# Patient Record
Sex: MALE | Race: WHITE | NOT HISPANIC OR LATINO
[De-identification: names, ages, dates, MRNs, and addresses within clinical notes are randomized per-mention and may not be internally consistent; named-entity substitution may affect disease eponyms.]

---

## 2021-02-17 PROBLEM — Z00.00 ENCOUNTER FOR PREVENTIVE HEALTH EXAMINATION: Status: ACTIVE | Noted: 2021-02-17

## 2021-02-23 ENCOUNTER — APPOINTMENT (OUTPATIENT)
Dept: SURGERY | Facility: CLINIC | Age: 71
End: 2021-02-23
Payer: MEDICARE

## 2021-02-23 VITALS
WEIGHT: 185 LBS | TEMPERATURE: 98 F | DIASTOLIC BLOOD PRESSURE: 94 MMHG | OXYGEN SATURATION: 97 % | SYSTOLIC BLOOD PRESSURE: 169 MMHG | HEIGHT: 72 IN | BODY MASS INDEX: 25.06 KG/M2 | RESPIRATION RATE: 16 BRPM | HEART RATE: 57 BPM

## 2021-02-23 DIAGNOSIS — Z80.3 FAMILY HISTORY OF MALIGNANT NEOPLASM OF BREAST: ICD-10-CM

## 2021-02-23 DIAGNOSIS — F12.90 CANNABIS USE, UNSPECIFIED, UNCOMPLICATED: ICD-10-CM

## 2021-02-23 DIAGNOSIS — K57.92 DIVERTICULITIS OF INTESTINE, PART UNSPECIFIED, W/OUT PERFORATION OR ABSCESS W/OUT BLEEDING: ICD-10-CM

## 2021-02-23 DIAGNOSIS — K40.90 UNILATERAL INGUINAL HERNIA, W/OUT OBSTRUCTION OR GANGRENE, NOT SPECIFIED AS RECURRENT: ICD-10-CM

## 2021-02-23 DIAGNOSIS — Z87.891 PERSONAL HISTORY OF NICOTINE DEPENDENCE: ICD-10-CM

## 2021-02-23 DIAGNOSIS — R10.32 LEFT LOWER QUADRANT PAIN: ICD-10-CM

## 2021-02-23 DIAGNOSIS — Z80.0 FAMILY HISTORY OF MALIGNANT NEOPLASM OF DIGESTIVE ORGANS: ICD-10-CM

## 2021-02-23 PROCEDURE — 99203 OFFICE O/P NEW LOW 30 MIN: CPT

## 2021-02-23 NOTE — CONSULT LETTER
[Dear  ___] : Dear  [unfilled], [Consult Letter:] : I had the pleasure of evaluating your patient, [unfilled]. [Please see my note below.] : Please see my note below. [Consult Closing:] : Thank you very much for allowing me to participate in the care of this patient.  If you have any questions, please do not hesitate to contact me. [Sincerely,] : Sincerely, [FreeTextEntry3] : Nabeel Ocampo M.D., F.A.C.S, F.A.S.C.R.S

## 2021-02-23 NOTE — PHYSICAL EXAM
[Normal Rate and Rhythm] : normal rate and rhythm [Abdominal Masses] : No abdominal masses [Abdomen Tenderness] : ~T ~M No abdominal tenderness [No HSM] : no hepatosplenomegaly [No Rash or Lesion] : No rash or lesion [Alert] : alert [Calm] : calm [de-identified] : nad [de-identified] : Small reducible nontender left inguinal hernia.  No palpable right inguinal hernia or incisional hernia.  Cord and testes normal. [de-identified] : nl

## 2021-02-23 NOTE — HISTORY OF PRESENT ILLNESS
[de-identified] : Zafar is a 71 y/o male here for consultation visit. The patient reports shoveling snow on 1/31/21 and felt a bulge in his left groin. Denies severe pain. Intermittent discomfort. Wears a hernia belt. No imaging studies were performed.  His only abdominal surgery was a remote colon resection for diverticulitis via lower midline incision.

## 2021-02-23 NOTE — ASSESSMENT
[FreeTextEntry1] : In summary the patient has a small symptomatic left inguinal hernia.  I recommended that this be electively repaired with mesh.  I explained the risks benefits and alternatives including the rare complications of bleeding infection recurrence.

## 2021-02-25 ENCOUNTER — OUTPATIENT (OUTPATIENT)
Dept: OUTPATIENT SERVICES | Facility: HOSPITAL | Age: 71
LOS: 1 days | End: 2021-02-25
Payer: MEDICARE

## 2021-02-25 VITALS
RESPIRATION RATE: 16 BRPM | TEMPERATURE: 98 F | OXYGEN SATURATION: 99 % | HEIGHT: 72 IN | WEIGHT: 182.1 LBS | DIASTOLIC BLOOD PRESSURE: 94 MMHG | HEART RATE: 74 BPM | SYSTOLIC BLOOD PRESSURE: 151 MMHG

## 2021-02-25 DIAGNOSIS — K40.90 UNILATERAL INGUINAL HERNIA, WITHOUT OBSTRUCTION OR GANGRENE, NOT SPECIFIED AS RECURRENT: ICD-10-CM

## 2021-02-25 DIAGNOSIS — Z98.890 OTHER SPECIFIED POSTPROCEDURAL STATES: Chronic | ICD-10-CM

## 2021-02-25 DIAGNOSIS — Z01.818 ENCOUNTER FOR OTHER PREPROCEDURAL EXAMINATION: ICD-10-CM

## 2021-02-25 DIAGNOSIS — Z87.81 PERSONAL HISTORY OF (HEALED) TRAUMATIC FRACTURE: Chronic | ICD-10-CM

## 2021-02-25 DIAGNOSIS — Z90.89 ACQUIRED ABSENCE OF OTHER ORGANS: Chronic | ICD-10-CM

## 2021-02-25 LAB
ANION GAP SERPL CALC-SCNC: 15 MMOL/L — SIGNIFICANT CHANGE UP (ref 5–17)
BUN SERPL-MCNC: 22 MG/DL — SIGNIFICANT CHANGE UP (ref 7–23)
CALCIUM SERPL-MCNC: 9.8 MG/DL — SIGNIFICANT CHANGE UP (ref 8.4–10.5)
CHLORIDE SERPL-SCNC: 101 MMOL/L — SIGNIFICANT CHANGE UP (ref 96–108)
CO2 SERPL-SCNC: 23 MMOL/L — SIGNIFICANT CHANGE UP (ref 22–31)
CREAT SERPL-MCNC: 0.92 MG/DL — SIGNIFICANT CHANGE UP (ref 0.5–1.3)
GLUCOSE SERPL-MCNC: 91 MG/DL — SIGNIFICANT CHANGE UP (ref 70–99)
HCT VFR BLD CALC: 40.7 % — SIGNIFICANT CHANGE UP (ref 39–50)
HGB BLD-MCNC: 13.6 G/DL — SIGNIFICANT CHANGE UP (ref 13–17)
MCHC RBC-ENTMCNC: 29.4 PG — SIGNIFICANT CHANGE UP (ref 27–34)
MCHC RBC-ENTMCNC: 33.4 GM/DL — SIGNIFICANT CHANGE UP (ref 32–36)
MCV RBC AUTO: 88.1 FL — SIGNIFICANT CHANGE UP (ref 80–100)
NRBC # BLD: 0 /100 WBCS — SIGNIFICANT CHANGE UP (ref 0–0)
PLATELET # BLD AUTO: 178 K/UL — SIGNIFICANT CHANGE UP (ref 150–400)
POTASSIUM SERPL-MCNC: 4.2 MMOL/L — SIGNIFICANT CHANGE UP (ref 3.5–5.3)
POTASSIUM SERPL-SCNC: 4.2 MMOL/L — SIGNIFICANT CHANGE UP (ref 3.5–5.3)
RBC # BLD: 4.62 M/UL — SIGNIFICANT CHANGE UP (ref 4.2–5.8)
RBC # FLD: 13.9 % — SIGNIFICANT CHANGE UP (ref 10.3–14.5)
SODIUM SERPL-SCNC: 139 MMOL/L — SIGNIFICANT CHANGE UP (ref 135–145)
WBC # BLD: 7.97 K/UL — SIGNIFICANT CHANGE UP (ref 3.8–10.5)
WBC # FLD AUTO: 7.97 K/UL — SIGNIFICANT CHANGE UP (ref 3.8–10.5)

## 2021-02-25 PROCEDURE — 85027 COMPLETE CBC AUTOMATED: CPT

## 2021-02-25 PROCEDURE — G0463: CPT

## 2021-02-25 PROCEDURE — 80048 BASIC METABOLIC PNL TOTAL CA: CPT

## 2021-02-25 RX ORDER — LIDOCAINE HCL 20 MG/ML
0.2 VIAL (ML) INJECTION ONCE
Refills: 0 | Status: DISCONTINUED | OUTPATIENT
Start: 2021-03-01 | End: 2021-03-16

## 2021-02-25 RX ORDER — SODIUM CHLORIDE 9 MG/ML
3 INJECTION INTRAMUSCULAR; INTRAVENOUS; SUBCUTANEOUS EVERY 8 HOURS
Refills: 0 | Status: DISCONTINUED | OUTPATIENT
Start: 2021-03-01 | End: 2021-03-16

## 2021-02-25 NOTE — H&P PST ADULT - NSICDXPROBLEM_GEN_ALL_CORE_FT
PROBLEM DIAGNOSES  Problem: Left inguinal hernia  Assessment and Plan: Left Inguinal Hernia Repair.    02/25/21.

## 2021-02-25 NOTE — H&P PST ADULT - NSICDXPASTSURGICALHX_GEN_ALL_CORE_FT
PAST SURGICAL HISTORY:  H/O finger fracture repair    H/O hand surgery     History of incision and drainage     History of open sigmoidectomy 1990 - for diverticulitis    S/P tonsillectomy

## 2021-02-25 NOTE — H&P PST ADULT - HISTORY OF PRESENT ILLNESS
70 y.o. male c/o bulge in his Left groin after shoveling snow on 01/31/21. He was seen by Dr. JENNI Ocampo and was found to have a Left inguinal hernia. Patient is scheduled for Left Inguinal Hernia Repair.  70 y.o. male c/o bulge in his Left groin after shoveling snow on 01/31/21. Denies severe pain. He was seen by Dr. JENNI Ocampo and was found to have a Left inguinal hernia. Patient is scheduled for Left Inguinal Hernia Repair.

## 2021-02-26 ENCOUNTER — OUTPATIENT (OUTPATIENT)
Dept: OUTPATIENT SERVICES | Facility: HOSPITAL | Age: 71
LOS: 1 days | End: 2021-02-26
Payer: MEDICARE

## 2021-02-26 DIAGNOSIS — Z87.81 PERSONAL HISTORY OF (HEALED) TRAUMATIC FRACTURE: Chronic | ICD-10-CM

## 2021-02-26 DIAGNOSIS — Z90.89 ACQUIRED ABSENCE OF OTHER ORGANS: Chronic | ICD-10-CM

## 2021-02-26 DIAGNOSIS — Z98.890 OTHER SPECIFIED POSTPROCEDURAL STATES: Chronic | ICD-10-CM

## 2021-02-26 DIAGNOSIS — Z11.52 ENCOUNTER FOR SCREENING FOR COVID-19: ICD-10-CM

## 2021-02-26 LAB — SARS-COV-2 RNA SPEC QL NAA+PROBE: SIGNIFICANT CHANGE UP

## 2021-02-26 PROCEDURE — U0003: CPT

## 2021-02-26 PROCEDURE — U0005: CPT

## 2021-02-26 PROCEDURE — C9803: CPT

## 2021-02-28 ENCOUNTER — TRANSCRIPTION ENCOUNTER (OUTPATIENT)
Age: 71
End: 2021-02-28

## 2021-03-01 ENCOUNTER — APPOINTMENT (OUTPATIENT)
Dept: SURGERY | Facility: HOSPITAL | Age: 71
End: 2021-03-01
Payer: MEDICARE

## 2021-03-01 ENCOUNTER — OUTPATIENT (OUTPATIENT)
Dept: OUTPATIENT SERVICES | Facility: HOSPITAL | Age: 71
LOS: 1 days | End: 2021-03-01
Payer: MEDICARE

## 2021-03-01 VITALS
HEART RATE: 72 BPM | TEMPERATURE: 97 F | RESPIRATION RATE: 16 BRPM | DIASTOLIC BLOOD PRESSURE: 89 MMHG | SYSTOLIC BLOOD PRESSURE: 157 MMHG | OXYGEN SATURATION: 100 %

## 2021-03-01 VITALS
WEIGHT: 182.1 LBS | SYSTOLIC BLOOD PRESSURE: 133 MMHG | OXYGEN SATURATION: 98 % | TEMPERATURE: 97 F | DIASTOLIC BLOOD PRESSURE: 79 MMHG | HEIGHT: 72 IN | RESPIRATION RATE: 16 BRPM | HEART RATE: 58 BPM

## 2021-03-01 DIAGNOSIS — K40.90 UNILATERAL INGUINAL HERNIA, WITHOUT OBSTRUCTION OR GANGRENE, NOT SPECIFIED AS RECURRENT: ICD-10-CM

## 2021-03-01 DIAGNOSIS — Z87.81 PERSONAL HISTORY OF (HEALED) TRAUMATIC FRACTURE: Chronic | ICD-10-CM

## 2021-03-01 DIAGNOSIS — Z98.890 OTHER SPECIFIED POSTPROCEDURAL STATES: Chronic | ICD-10-CM

## 2021-03-01 DIAGNOSIS — Z90.89 ACQUIRED ABSENCE OF OTHER ORGANS: Chronic | ICD-10-CM

## 2021-03-01 PROCEDURE — C1781: CPT

## 2021-03-01 PROCEDURE — 49505 PRP I/HERN INIT REDUC >5 YR: CPT | Mod: LT

## 2021-03-01 RX ORDER — CHLORHEXIDINE GLUCONATE 213 G/1000ML
1 SOLUTION TOPICAL ONCE
Refills: 0 | Status: COMPLETED | OUTPATIENT
Start: 2021-03-01 | End: 2021-03-01

## 2021-03-01 RX ORDER — OXYCODONE HYDROCHLORIDE 5 MG/1
1 TABLET ORAL
Qty: 20 | Refills: 0
Start: 2021-03-01 | End: 2021-03-05

## 2021-03-01 RX ORDER — HYDROMORPHONE HYDROCHLORIDE 2 MG/ML
0.25 INJECTION INTRAMUSCULAR; INTRAVENOUS; SUBCUTANEOUS
Refills: 0 | Status: DISCONTINUED | OUTPATIENT
Start: 2021-03-01 | End: 2021-03-01

## 2021-03-01 RX ORDER — ONDANSETRON 8 MG/1
4 TABLET, FILM COATED ORAL ONCE
Refills: 0 | Status: DISCONTINUED | OUTPATIENT
Start: 2021-03-01 | End: 2021-03-16

## 2021-03-01 RX ORDER — SODIUM CHLORIDE 9 MG/ML
1000 INJECTION, SOLUTION INTRAVENOUS
Refills: 0 | Status: DISCONTINUED | OUTPATIENT
Start: 2021-03-01 | End: 2021-03-16

## 2021-03-01 RX ORDER — OXYCODONE HYDROCHLORIDE 5 MG/1
5 TABLET ORAL ONCE
Refills: 0 | Status: DISCONTINUED | OUTPATIENT
Start: 2021-03-01 | End: 2021-03-01

## 2021-03-01 RX ORDER — CEFAZOLIN SODIUM 1 G
2000 VIAL (EA) INJECTION ONCE
Refills: 0 | Status: COMPLETED | OUTPATIENT
Start: 2021-03-01 | End: 2021-03-01

## 2021-03-01 RX ADMIN — CHLORHEXIDINE GLUCONATE 1 APPLICATION(S): 213 SOLUTION TOPICAL at 13:47

## 2021-03-01 NOTE — ASU DISCHARGE PLAN (ADULT/PEDIATRIC) - CARE PROVIDER_API CALL
Nabeel Ocampo)  ColonRectal Surgery; Surgery  [address], [address]  [place] [address]  Phone: [phone]  Fax: [phone]  Follow Up Time: 1 week   Nabeel Ocampo)  ColonRectal Surgery; Surgery  [address], [address]  [place] [address]  Phone: [phone]  Fax: [phone]  Follow Up Time: 2 weeks

## 2021-03-01 NOTE — ASU DISCHARGE PLAN (ADULT/PEDIATRIC) - PROVIDER TOKENS
PROVIDER:[TOKEN:[[id number]],FOLLOWUP:[1 week]] PROVIDER:[TOKEN:[[id number]],FOLLOWUP:[2 weeks]]

## 2021-03-01 NOTE — ASU PATIENT PROFILE, ADULT - PSH
H/O finger fracture  repair  H/O hand surgery    History of incision and drainage    History of open sigmoidectomy  1990 - for diverticulitis  S/P tonsillectomy

## 2021-03-17 PROBLEM — Z87.19 PERSONAL HISTORY OF OTHER DISEASES OF THE DIGESTIVE SYSTEM: Chronic | Status: ACTIVE | Noted: 2021-02-25

## 2021-04-09 ENCOUNTER — APPOINTMENT (OUTPATIENT)
Dept: SURGERY | Facility: CLINIC | Age: 71
End: 2021-04-09
Payer: MEDICARE

## 2021-04-09 VITALS
RESPIRATION RATE: 16 BRPM | OXYGEN SATURATION: 98 % | HEART RATE: 65 BPM | DIASTOLIC BLOOD PRESSURE: 90 MMHG | TEMPERATURE: 98 F | SYSTOLIC BLOOD PRESSURE: 155 MMHG

## 2021-04-09 PROCEDURE — 99024 POSTOP FOLLOW-UP VISIT: CPT

## 2021-04-09 NOTE — REASON FOR VISIT
[Post Op: _________] : a [unfilled] post op visit [FreeTextEntry1] : S/P Left inguinal hernia repair with mesh.\par

## 2021-04-09 NOTE — HISTORY OF PRESENT ILLNESS
[de-identified] : Zafar is a 70 year old male s/p LIH repair 3/10/21. In the office today the patient reports feeling well. Denies pain. Tolerating a regular diet. Denies nausea/vomiting.

## 2021-04-09 NOTE — PHYSICAL EXAM
[de-identified] : Soft, nontender, incision healed without evidence of infection or hernia.  Cord and testes normal bilaterally.

## 2021-04-09 NOTE — ASSESSMENT
[FreeTextEntry1] : In summary the patient is doing well status post left inguinal hernia repair with mesh.  His postoperative course has been uncomplicated.  I instructed him that he may resume his normal activities as tolerated and only needs to see me as needed.

## 2022-07-01 ENCOUNTER — APPOINTMENT (OUTPATIENT)
Dept: SURGERY | Facility: CLINIC | Age: 72
End: 2022-07-01

## 2022-07-01 VITALS
DIASTOLIC BLOOD PRESSURE: 72 MMHG | TEMPERATURE: 97.8 F | HEART RATE: 73 BPM | SYSTOLIC BLOOD PRESSURE: 124 MMHG | OXYGEN SATURATION: 96 % | RESPIRATION RATE: 16 BRPM

## 2022-07-01 DIAGNOSIS — K40.90 UNILATERAL INGUINAL HERNIA, W/OUT OBSTRUCTION OR GANGRENE, NOT SPECIFIED AS RECURRENT: ICD-10-CM

## 2022-07-01 PROCEDURE — 99213 OFFICE O/P EST LOW 20 MIN: CPT

## 2022-07-01 RX ORDER — SODIUM SULFATE, MAGNESIUM SULFATE, AND POTASSIUM CHLORIDE 17.75; 2.7; 2.25 G/1; G/1; G/1
1479-225-188 TABLET ORAL
Qty: 24 | Refills: 0 | Status: DISCONTINUED | COMMUNITY
Start: 2022-05-04

## 2022-07-01 NOTE — ASSESSMENT
[FreeTextEntry1] : In summary the patient has a symptomatic enlarging right inguinal hernia.  I recommended that this be electively repaired with mesh.  I explained the risks benefits and alternatives of surgery including the rare complications of bleeding infection recurrent spermatic cord injury and prolonged postoperative pain.

## 2022-07-01 NOTE — HISTORY OF PRESENT ILLNESS
[de-identified] : Zafar is a 70 year old male here for follow up. S/p LIH repair 3/10/21.\par \par Today pt reports feeling well, no pain. Reports feeling and seeing right inguinal bulge for a month, occasional discomfort. Formed BMs daily, straining. Decreased appetite, no nausea, no vomiting. Denies fever and chills.

## 2022-07-01 NOTE — PHYSICAL EXAM
[Normal Breath Sounds] : Normal breath sounds [Normal Rate and Rhythm] : normal rate and rhythm [Abdominal Masses] : No abdominal masses [No HSM] : no hepatosplenomegaly [No Rash or Lesion] : No rash or lesion [Alert] : alert [Calm] : calm [de-identified] : nad [de-identified] : Moderate sized reducible right inguinal hernia.  No palpable recurrent left inguinal hernia.  Cord and testes normal bilaterally. [de-identified] : nl

## 2022-07-18 ENCOUNTER — OUTPATIENT (OUTPATIENT)
Dept: OUTPATIENT SERVICES | Facility: HOSPITAL | Age: 72
LOS: 1 days | End: 2022-07-18
Payer: MEDICARE

## 2022-07-18 VITALS
WEIGHT: 158.07 LBS | SYSTOLIC BLOOD PRESSURE: 136 MMHG | HEIGHT: 72 IN | DIASTOLIC BLOOD PRESSURE: 74 MMHG | RESPIRATION RATE: 15 BRPM | HEART RATE: 63 BPM | TEMPERATURE: 97 F | OXYGEN SATURATION: 97 %

## 2022-07-18 DIAGNOSIS — Z98.890 OTHER SPECIFIED POSTPROCEDURAL STATES: Chronic | ICD-10-CM

## 2022-07-18 DIAGNOSIS — K40.90 UNILATERAL INGUINAL HERNIA, WITHOUT OBSTRUCTION OR GANGRENE, NOT SPECIFIED AS RECURRENT: ICD-10-CM

## 2022-07-18 DIAGNOSIS — Z87.81 PERSONAL HISTORY OF (HEALED) TRAUMATIC FRACTURE: Chronic | ICD-10-CM

## 2022-07-18 DIAGNOSIS — Z90.89 ACQUIRED ABSENCE OF OTHER ORGANS: Chronic | ICD-10-CM

## 2022-07-18 DIAGNOSIS — Z01.818 ENCOUNTER FOR OTHER PREPROCEDURAL EXAMINATION: ICD-10-CM

## 2022-07-18 PROCEDURE — 85027 COMPLETE CBC AUTOMATED: CPT

## 2022-07-18 PROCEDURE — 80048 BASIC METABOLIC PNL TOTAL CA: CPT

## 2022-07-18 PROCEDURE — G0463: CPT

## 2022-07-18 PROCEDURE — 36415 COLL VENOUS BLD VENIPUNCTURE: CPT

## 2022-07-18 RX ORDER — SODIUM CHLORIDE 9 MG/ML
1000 INJECTION, SOLUTION INTRAVENOUS
Refills: 0 | Status: DISCONTINUED | OUTPATIENT
Start: 2022-07-27 | End: 2022-08-10

## 2022-07-18 NOTE — H&P PST ADULT - FALL HARM RISK - UNIVERSAL INTERVENTIONS
Bed in lowest position, wheels locked, appropriate side rails in place/Call bell, personal items and telephone in reach/Instruct patient to call for assistance before getting out of bed or chair/Non-slip footwear when patient is out of bed/[place] to call system/Physically safe environment - no spills, clutter or unnecessary equipment/Purposeful Proactive Rounding/Room/bathroom lighting operational, light cord in reach

## 2022-07-18 NOTE — H&P PST ADULT - HISTORY OF PRESENT ILLNESS
72 yr old male with hx of diverticulitis, bilateral inguinal hernias s/p left hernia repair now for right    **COVID  denies foreign travel  denies s/s  denies known exposure  not vaccinated   swab 7/24 Peña  72 yr old male with hx of diverticulitis, bilateral inguinal hernias s/p left hernia repair now for right  **COVID  denies foreign travel  denies s/s  denies known exposure  not vaccinated   swab 7/24 [place]     **7/19/22 confirmed patient is seeing PCP for evaluation preop due to wheezing, surgeon notified [facility] NAVARRETE

## 2022-07-18 NOTE — H&P PST ADULT - NSICDXPASTMEDICALHX_GEN_ALL_CORE_FT
PAST MEDICAL HISTORY:  COPD, mild     History of diverticulitis 1990    Left inguinal hernia March 2021    Right inguinal hernia      PAST MEDICAL HISTORY:  COPD, mild     H/O abnormal weight loss w/o  done    History of diverticulitis 1990    History of hepatitis A many years ago    Left inguinal hernia March 2021    Right inguinal hernia

## 2022-07-18 NOTE — H&P PST ADULT - NSICDXPASTSURGICALHX_GEN_ALL_CORE_FT
PAST SURGICAL HISTORY:  H/O endoscopy     H/O finger fracture repair    H/O hand surgery     History of incision and drainage     History of open sigmoidectomy 1990 - for diverticulitis    S/P colonoscopy     S/P left inguinal hernia repair     S/P tonsillectomy

## 2022-07-24 ENCOUNTER — OUTPATIENT (OUTPATIENT)
Dept: OUTPATIENT SERVICES | Facility: HOSPITAL | Age: 72
LOS: 1 days | End: 2022-07-24
Payer: MEDICARE

## 2022-07-24 DIAGNOSIS — Z90.89 ACQUIRED ABSENCE OF OTHER ORGANS: Chronic | ICD-10-CM

## 2022-07-24 DIAGNOSIS — Z98.890 OTHER SPECIFIED POSTPROCEDURAL STATES: Chronic | ICD-10-CM

## 2022-07-24 DIAGNOSIS — Z87.81 PERSONAL HISTORY OF (HEALED) TRAUMATIC FRACTURE: Chronic | ICD-10-CM

## 2022-07-24 DIAGNOSIS — Z11.52 ENCOUNTER FOR SCREENING FOR COVID-19: ICD-10-CM

## 2022-07-24 LAB — SARS-COV-2 RNA SPEC QL NAA+PROBE: SIGNIFICANT CHANGE UP

## 2022-07-24 PROCEDURE — U0003: CPT

## 2022-07-24 PROCEDURE — U0005: CPT

## 2022-07-24 PROCEDURE — C9803: CPT

## 2022-07-26 ENCOUNTER — TRANSCRIPTION ENCOUNTER (OUTPATIENT)
Age: 72
End: 2022-07-26

## 2022-07-27 ENCOUNTER — APPOINTMENT (OUTPATIENT)
Dept: SURGERY | Facility: HOSPITAL | Age: 72
End: 2022-07-27

## 2022-07-27 ENCOUNTER — TRANSCRIPTION ENCOUNTER (OUTPATIENT)
Age: 72
End: 2022-07-27

## 2022-07-27 ENCOUNTER — OUTPATIENT (OUTPATIENT)
Dept: OUTPATIENT SERVICES | Facility: HOSPITAL | Age: 72
LOS: 1 days | End: 2022-07-27
Payer: MEDICARE

## 2022-07-27 VITALS
DIASTOLIC BLOOD PRESSURE: 79 MMHG | OXYGEN SATURATION: 95 % | TEMPERATURE: 97 F | SYSTOLIC BLOOD PRESSURE: 149 MMHG | HEART RATE: 64 BPM | RESPIRATION RATE: 17 BRPM

## 2022-07-27 VITALS
HEART RATE: 51 BPM | WEIGHT: 158.07 LBS | HEIGHT: 72 IN | SYSTOLIC BLOOD PRESSURE: 112 MMHG | RESPIRATION RATE: 16 BRPM | OXYGEN SATURATION: 99 % | TEMPERATURE: 98 F | DIASTOLIC BLOOD PRESSURE: 70 MMHG

## 2022-07-27 DIAGNOSIS — K40.90 UNILATERAL INGUINAL HERNIA, WITHOUT OBSTRUCTION OR GANGRENE, NOT SPECIFIED AS RECURRENT: ICD-10-CM

## 2022-07-27 DIAGNOSIS — Z98.890 OTHER SPECIFIED POSTPROCEDURAL STATES: Chronic | ICD-10-CM

## 2022-07-27 DIAGNOSIS — Z87.81 PERSONAL HISTORY OF (HEALED) TRAUMATIC FRACTURE: Chronic | ICD-10-CM

## 2022-07-27 DIAGNOSIS — Z90.89 ACQUIRED ABSENCE OF OTHER ORGANS: Chronic | ICD-10-CM

## 2022-07-27 DIAGNOSIS — Z01.818 ENCOUNTER FOR OTHER PREPROCEDURAL EXAMINATION: ICD-10-CM

## 2022-07-27 PROCEDURE — C1781: CPT

## 2022-07-27 PROCEDURE — 49505 PRP I/HERN INIT REDUC >5 YR: CPT | Mod: RT

## 2022-07-27 DEVICE — MESH HERNIA MARLEX 3 X 6": Type: IMPLANTABLE DEVICE | Status: FUNCTIONAL

## 2022-07-27 DEVICE — MESH HERNIA TISS REINF 8X8CM: Type: IMPLANTABLE DEVICE | Status: FUNCTIONAL

## 2022-07-27 RX ORDER — CHLORHEXIDINE GLUCONATE 213 G/1000ML
1 SOLUTION TOPICAL ONCE
Refills: 0 | Status: COMPLETED | OUTPATIENT
Start: 2022-07-27 | End: 2022-07-27

## 2022-07-27 RX ORDER — LIDOCAINE HCL 20 MG/ML
0.2 VIAL (ML) INJECTION ONCE
Refills: 0 | Status: COMPLETED | OUTPATIENT
Start: 2022-07-27 | End: 2022-07-27

## 2022-07-27 RX ORDER — OXYCODONE HYDROCHLORIDE 5 MG/1
1 TABLET ORAL
Qty: 6 | Refills: 0
Start: 2022-07-27

## 2022-07-27 RX ORDER — CEFAZOLIN SODIUM 1 G
2000 VIAL (EA) INJECTION ONCE
Refills: 0 | Status: COMPLETED | OUTPATIENT
Start: 2022-07-27 | End: 2022-07-27

## 2022-07-27 RX ORDER — ONDANSETRON 8 MG/1
4 TABLET, FILM COATED ORAL ONCE
Refills: 0 | Status: DISCONTINUED | OUTPATIENT
Start: 2022-07-27 | End: 2022-08-10

## 2022-07-27 RX ORDER — FENTANYL CITRATE 50 UG/ML
25 INJECTION INTRAVENOUS
Refills: 0 | Status: DISCONTINUED | OUTPATIENT
Start: 2022-07-27 | End: 2022-07-27

## 2022-07-27 RX ADMIN — CHLORHEXIDINE GLUCONATE 1 APPLICATION(S): 213 SOLUTION TOPICAL at 09:39

## 2022-07-27 RX ADMIN — SODIUM CHLORIDE 100 MILLILITER(S): 9 INJECTION, SOLUTION INTRAVENOUS at 09:39

## 2022-07-27 NOTE — ASU DISCHARGE PLAN (ADULT/PEDIATRIC) - NURSING INSTRUCTIONS
You received a dose of Tylenol today at 11:20 AM this morning. If needed, next dose time is 5:20 PM this evening. Do not exceed 4,000 mg of Tylenol in a 24 hour period.

## 2022-07-27 NOTE — ASU PATIENT PROFILE, ADULT - FALL HARM RISK - UNIVERSAL INTERVENTIONS
Bed in lowest position, wheels locked, appropriate side rails in place/Call bell, personal items and telephone in reach/Instruct patient to call for assistance before getting out of bed or chair/Non-slip footwear when patient is out of bed/[place] to call system/Physically safe environment - no spills, clutter or unnecessary equipment/Purposeful Proactive Rounding/Room/bathroom lighting operational, light cord in reach

## 2022-07-27 NOTE — ASU DISCHARGE PLAN (ADULT/PEDIATRIC) - CARE PROVIDER_API CALL
Nabeel Ocampo)  ColonRectal Surgery; Surgery  [address], [address]  [place] [address]  Phone: [phone]  Fax: [phone]  Established Patient  Follow Up Time: 2 weeks

## 2022-07-27 NOTE — ASU PATIENT PROFILE, ADULT - NSICDXPASTMEDICALHX_GEN_ALL_CORE_FT
PAST MEDICAL HISTORY:  COPD, mild     H/O abnormal weight loss w/o  done    History of diverticulitis 1990    History of hepatitis A many years ago    Left inguinal hernia March 2021    Right inguinal hernia

## 2022-07-27 NOTE — ASU DISCHARGE PLAN (ADULT/PEDIATRIC) - ASU DC SPECIAL INSTRUCTIONSFT
Please see printed sheet for detailed instructions.     Please call Dr Ocampo's office to make an appointment for 1-2 weeks after surgery.     For pain control, please take tylenol every 4-6 hours and oxycodone for breakthrough pain every 4-6 hours.

## 2022-07-29 PROBLEM — Z86.19 PERSONAL HISTORY OF OTHER INFECTIOUS AND PARASITIC DISEASES: Chronic | Status: ACTIVE | Noted: 2022-07-18

## 2022-07-29 PROBLEM — J44.9 CHRONIC OBSTRUCTIVE PULMONARY DISEASE, UNSPECIFIED: Chronic | Status: ACTIVE | Noted: 2022-07-18

## 2022-07-29 PROBLEM — K40.90 UNILATERAL INGUINAL HERNIA, WITHOUT OBSTRUCTION OR GANGRENE, NOT SPECIFIED AS RECURRENT: Chronic | Status: ACTIVE | Noted: 2021-02-25

## 2022-07-29 PROBLEM — Z87.898 PERSONAL HISTORY OF OTHER SPECIFIED CONDITIONS: Chronic | Status: ACTIVE | Noted: 2022-07-18

## 2022-07-29 PROBLEM — K40.90 UNILATERAL INGUINAL HERNIA, WITHOUT OBSTRUCTION OR GANGRENE, NOT SPECIFIED AS RECURRENT: Chronic | Status: ACTIVE | Noted: 2022-07-18

## 2022-08-12 PROBLEM — Z09 POSTOPERATIVE EXAMINATION: Status: ACTIVE | Noted: 2022-08-12

## 2022-08-16 ENCOUNTER — APPOINTMENT (OUTPATIENT)
Dept: SURGERY | Facility: CLINIC | Age: 72
End: 2022-08-16

## 2022-08-16 VITALS
SYSTOLIC BLOOD PRESSURE: 109 MMHG | HEART RATE: 65 BPM | DIASTOLIC BLOOD PRESSURE: 70 MMHG | OXYGEN SATURATION: 95 % | TEMPERATURE: 97.5 F | RESPIRATION RATE: 17 BRPM

## 2022-08-16 DIAGNOSIS — Z09 ENCOUNTER FOR FOLLOW-UP EXAMINATION AFTER COMPLETED TREATMENT FOR CONDITIONS OTHER THAN MALIGNANT NEOPLASM: ICD-10-CM

## 2022-08-16 PROCEDURE — 99024 POSTOP FOLLOW-UP VISIT: CPT

## 2022-08-16 RX ORDER — OXYCODONE 5 MG/1
5 TABLET ORAL
Qty: 10 | Refills: 0 | Status: DISCONTINUED | COMMUNITY
Start: 2022-07-27 | End: 2022-08-16

## 2022-08-16 NOTE — PHYSICAL EXAM
[de-identified] : Right groin incision healed without evidence of infection or persistent hernia. [de-identified] : 1 cm sebaceous cyst of the left upper back without evidence of infection

## 2022-08-16 NOTE — HISTORY OF PRESENT ILLNESS
[de-identified] : Zafar is a 73 y/o male here for post-op visit.   S/P Right inguinal hernia repair with mesh on 7/27/22.\par \par Last seen 7/1/22 - Gastrointestinal:. Moderate sized reducible right inguinal hernia. No palpable recurrent left inguinal hernia. Cord and testes normal bilaterally. No abdominal masses. In summary the patient has a symptomatic enlarging right inguinal hernia. I recommended that this be electively repaired with mesh. I explained the risks benefits and alternatives of surgery including the rare complications of bleeding infection recurrent spermatic cord injury and prolonged postoperative pain.\par \par Pt reports no pain. Changed diet after loosing weight (25 pounds), gained weight back.  Denies fevers and chills. Denies nausea and vomiting. Daily BM, with bleeding on TP and dripping in bowl, feels tissue prolapse but goes back in. Denies incontinence. Not taking anticoagulants. No drainage or swelling of incision.

## 2022-08-16 NOTE — ASSESSMENT
[FreeTextEntry1] : In summary the patient doing well status post right inguinal hernia repair with mesh.  Instructed him that he may resume his normal activities as tolerated.  He complains of painless rectal bleeding and what sounds like reducible internal hemorrhoids.  I recommended he follow-up for a anoscopy and possible rubber band ligations if his bleeding persists.  Thirdly he has a small symptomatic sebaceous cyst of his left upper back.  I recommended that this be electively excised in the office.

## 2022-08-16 NOTE — CONSULT LETTER
[Dear  ___] : Dear  [unfilled], [Consult Letter:] : I had the pleasure of evaluating your patient, [unfilled]. [Please see my note below.] : Please see my note below. [Consult Closing:] : Thank you very much for allowing me to participate in the care of this patient.  If you have any questions, please do not hesitate to contact me. [Sincerely,] : Sincerely, [FreeTextEntry3] : Nabeel Ocampo M.D., F.A.C.S, F.A.S.C.R.S [DrMarietta  ___] : Dr. SANCHEZ

## 2022-09-02 ENCOUNTER — APPOINTMENT (OUTPATIENT)
Dept: SURGERY | Facility: CLINIC | Age: 72
End: 2022-09-02

## 2022-09-02 VITALS — HEART RATE: 57 BPM | RESPIRATION RATE: 17 BRPM | OXYGEN SATURATION: 100 % | TEMPERATURE: 97.1 F

## 2022-09-02 DIAGNOSIS — L72.3 SEBACEOUS CYST: ICD-10-CM

## 2022-09-02 PROCEDURE — 21930 EXC BACK LES SC < 3 CM: CPT | Mod: 79

## 2022-09-02 PROCEDURE — 99024 POSTOP FOLLOW-UP VISIT: CPT

## 2022-09-02 NOTE — ASSESSMENT
[FreeTextEntry1] : In summary the patient is doing well status post right inguinal hernia repair with mesh.  He has 2 chronic sebaceous cysts of his back which were excised in the office today and sent to pathology.  I will see him in the office in 1 week for suture removal.

## 2022-09-02 NOTE — HISTORY OF PRESENT ILLNESS
[de-identified] : Zafar is a 73 y/o male here for post-op visit. S/P Right inguinal hernia repair with mesh on 7/27/22.\par \par Last seen on 8/16/22 - Gastrointestinal:. Right groin incision healed without evidence of infection or persistent hernia. Skin:. 1 cm sebaceous cyst of the left upper back without evidence of infection. In summary the patient doing well status post right inguinal hernia repair with mesh. Instructed him that he may resume his normal activities as tolerated. He complains of painless rectal bleeding and what sounds like reducible internal hemorrhoids. I recommended he follow-up for a anoscopy and possible rubber band ligations if his bleeding persists. Thirdly he has a small symptomatic sebaceous cyst of his left upper back. I recommended that this be electively excised in the office. \par \par Today pt reports no pain. BMs are hard, with straining, with bleeding into bowl. Not taking any anticoagulants. Still has the sebaceous cyst on left upper back.

## 2022-09-02 NOTE — PHYSICAL EXAM
[de-identified] : Right groin incision healed without evidence of infection or hernia [de-identified] : Left upper back 1 cm sebaceous cyst excised under local anesthesia under sterile conditions using 5 cc of half percent Marcaine with 1% lidocaine and epinephrine the entire cyst and sac was excised and sent to pathology hemostasis was assured and the incision closed in two 4-0 nylon sutures in a vertical mattress fashion.  Then attention then turned to a small sebaceous cyst on his right upper back which was excised in an identical fashion using 5 more cc of half sent Marcaine with 1% lidocai.  The cyst was excised the cyst sac was excised in its entirety and sent to pathology for permanent examination hemostasis was assured and the incision closed using two 4-0 Monocryl nylon sutures in a vertical mattress fashion.  Sterile dressings were applied the patient tolerated procedure well

## 2022-09-09 ENCOUNTER — APPOINTMENT (OUTPATIENT)
Dept: SURGERY | Facility: CLINIC | Age: 72
End: 2022-09-09

## 2022-09-09 VITALS
DIASTOLIC BLOOD PRESSURE: 81 MMHG | HEART RATE: 57 BPM | TEMPERATURE: 97 F | RESPIRATION RATE: 16 BRPM | OXYGEN SATURATION: 98 % | SYSTOLIC BLOOD PRESSURE: 156 MMHG

## 2022-09-09 DIAGNOSIS — Z09 ENCOUNTER FOR FOLLOW-UP EXAMINATION AFTER COMPLETED TREATMENT FOR CONDITIONS OTHER THAN MALIGNANT NEOPLASM: ICD-10-CM

## 2022-09-09 PROCEDURE — 99024 POSTOP FOLLOW-UP VISIT: CPT

## 2022-09-09 NOTE — HISTORY OF PRESENT ILLNESS
[de-identified] : Zafar is a 73 y/o male here for post-op visit. S/P Right inguinal hernia repair with mesh on 7/27/22.\par \par Last seen on 09/02/22- Right groin incision healed without evidence of infection or hernia. Left upper back 1 cm sebaceous cyst excised under local anesthesia under sterile conditions using 5 cc of half percent Marcaine with 1% lidocaine and epinephrine the entire cyst and sac was excised and sent to pathology hemostasis was assured and the incision closed in two 4-0 nylon sutures in a vertical mattress fashion. Then attention then turned to a small sebaceous cyst on his right upper back which was excised in an identical fashion using 5 more cc of half sent Marcaine with 1% lidocai. The cyst was excised the cyst sac was excised in its entirety and sent to pathology for permanent examination hemostasis was assured and the incision closed using two 4-0 Monocryl nylon sutures in a vertical mattress fashion. Sterile dressings were applied the patient tolerated procedure well. In summary the patient is doing well status post right inguinal hernia repair with mesh. He has 2 chronic sebaceous cysts of his back which were excised in the office today and sent to pathology. I will see him in the office in 1 week for suture removal. \par \par Today pt reports feeling well, no pain. Pt reports taking Tylenol for one day post procedure. Denies drainage from site. Formed BM daily, no pain, no bleeding. Good appetite, no nausea, no vomiting. Denies fever and chills.

## 2022-09-09 NOTE — ASSESSMENT
[FreeTextEntry1] : In summary the patient is doing well status post excision of 2 sebaceous cyst from his upper back.  The incisions are healed there is no sign of infection.  The pathology is not back yet.  I told him to call me in 1 week to confirm pathology of sebaceous cyst.  Otherwise I only need to see him as needed

## 2022-09-09 NOTE — PHYSICAL EXAM
[de-identified] : 2 incisions on the upper back healed without evidence of infection.  Sutures removed from both incisions and 2 Steri-Strips applied.

## 2022-09-16 LAB — CORE LAB BIOPSY: NORMAL

## 2024-02-07 NOTE — BRIEF OPERATIVE NOTE - NSICDXBRIEFPREOP_GEN_ALL_CORE_FT
Spoke with Renetta from [facility]. [facility] received a prescription for Humira and patient is currently using [facility] patient assistance. [facility] prescription was cancelled. Dr. Abdiaziz Baez - Prescription needs to be printed so that it can be faxed to [facility]. ([facility] can not print prescriptions) Thank you. PRE-OP DIAGNOSIS:  Right inguinal hernia 27-Jul-2022 12:36:28  Ronn Corbin

## (undated) DEVICE — SOL IRR POUR NS 0.9% 500ML

## (undated) DEVICE — DRSG MASTISOL

## (undated) DEVICE — BLADE SCALPEL SAFETYLOCK #15

## (undated) DEVICE — SOL IRR POUR H2O 250ML

## (undated) DEVICE — MEDICATION LABELS W MARKER

## (undated) DEVICE — DRSG TEGADERM + PAD 3.5X4"

## (undated) DEVICE — PACK ADULT HERNIA

## (undated) DEVICE — WARMING BLANKET UPPER ADULT

## (undated) DEVICE — DRAIN PENROSE .5" X 18" LATEX

## (undated) DEVICE — NDL HYPO SAFE 22G X 1.5" (BLACK)

## (undated) DEVICE — DRAPE TOWEL BLUE 17" X 24"

## (undated) DEVICE — SUT POLYSORB 2-0 30" V-20 UNDYED

## (undated) DEVICE — DRSG STERISTRIPS 0.5 X 4"

## (undated) DEVICE — SUT POLYSORB 2-0 18" TIES UNDYED

## (undated) DEVICE — SLV COMPRESSION KNEE MED

## (undated) DEVICE — SPECIMEN CONTAINER 100ML

## (undated) DEVICE — SUT MONOCRYL 4-0 27" PS-2 UNDYED

## (undated) DEVICE — GLV 7.5 PROTEXIS (WHITE)

## (undated) DEVICE — DRAPE INSTRUMENT POUCH 6.75" X 11"

## (undated) DEVICE — PREP CHLORAPREP HI-LITE ORANGE 26ML